# Patient Record
Sex: MALE | Race: WHITE | Employment: FULL TIME | URBAN - METROPOLITAN AREA
[De-identification: names, ages, dates, MRNs, and addresses within clinical notes are randomized per-mention and may not be internally consistent; named-entity substitution may affect disease eponyms.]

---

## 2017-01-19 ENCOUNTER — HOSPITAL ENCOUNTER (OUTPATIENT)
Dept: ULTRASOUND IMAGING | Age: 27
Discharge: HOME | End: 2017-01-19
Payer: COMMERCIAL

## 2017-01-19 DIAGNOSIS — N50.811 TESTICULAR PAIN, RIGHT: ICD-10-CM

## 2017-01-19 PROCEDURE — 76870 US EXAM SCROTUM: CPT

## 2021-03-19 ENCOUNTER — TRANSCRIBE ORDER (OUTPATIENT)
Dept: SCHEDULING | Age: 31
End: 2021-03-19

## 2021-03-19 DIAGNOSIS — R74.01 TRANSAMINITIS: Primary | ICD-10-CM

## 2021-04-02 ENCOUNTER — HOSPITAL ENCOUNTER (OUTPATIENT)
Dept: ULTRASOUND IMAGING | Age: 31
Discharge: HOME | End: 2021-04-02
Attending: INTERNAL MEDICINE
Payer: COMMERCIAL

## 2021-04-02 DIAGNOSIS — R74.01 TRANSAMINITIS: ICD-10-CM

## 2021-04-02 PROCEDURE — 76705 ECHO EXAM OF ABDOMEN: CPT

## 2021-11-18 ENCOUNTER — OFFICE VISIT (OUTPATIENT)
Dept: URGENT CARE | Age: 31
End: 2021-11-18
Payer: COMMERCIAL

## 2021-11-18 VITALS — TEMPERATURE: 98.6 F | HEART RATE: 72 BPM | RESPIRATION RATE: 16 BRPM | OXYGEN SATURATION: 95 %

## 2021-11-18 DIAGNOSIS — Z20.822 EXPOSURE TO COVID-19 VIRUS: Primary | ICD-10-CM

## 2021-11-18 LAB — SARS-COV-2 POC: NEGATIVE

## 2021-11-18 PROCEDURE — 87426 SARSCOV CORONAVIRUS AG IA: CPT | Performed by: FAMILY MEDICINE

## 2021-11-18 PROCEDURE — 99202 OFFICE O/P NEW SF 15 MIN: CPT | Performed by: FAMILY MEDICINE

## 2021-11-18 RX ORDER — DEXTROAMPHETAMINE SACCHARATE, AMPHETAMINE ASPARTATE, DEXTROAMPHETAMINE SULFATE, AND AMPHETAMINE SULFATE 5; 5; 5; 5 MG/1; MG/1; MG/1; MG/1
20 TABLET ORAL
COMMUNITY

## 2021-11-18 NOTE — LETTER
November 18, 2021  [address] [facility]    Dear Jules Red: Thank you for requesting access to [facility]. Please follow the instructions below to securely access and download your online medical record. [facility] allows you to send messages to your doctor, view your test results, renew your prescriptions, schedule appointments, and more. How Do I Sign Up? 1. In your internet browser, go to [email or web address]. 2. Click on the First Time User? Click Here link in the Sign In box. You will see the New Member Sign Up page. 3. Enter your [facility] Access Code exactly as it appears below. You will not need to use this code after youve completed the sign-up process. If you do not sign up before the expiration date, you must request a new code. [facility] Access Code: [email or web address]  Expires: 1/2/2022  1:56 PM     4. Enter the last four digits of your Social Security Number (xxxx) and Date of Birth (mm/dd/yyyy) as indicated and click Submit. You will be taken to the next sign-up page. 5. Create a [facility] ID. This will be your [facility] login ID and cannot be changed, so think of one that is secure and easy to remember. 6. Create a [facility] password. You can change your password at any time. 7. Enter your Password Reset Question and Answer. This can be used at a later time if you forget your password. 8. Enter your e-mail address. You will receive e-mail notification when new information is available in [address]. 9. Click Sign Up. You can now view and download portions of your medical record. 10. Click the Download Summary menu link to download a portable copy of your medical information. Additional Information    If you have questions, please visit the Frequently Asked Questions section of the [facility] website at [email or web address] Remember, [facility] is NOT to be used for urgent needs. For medical emergencies, dial 911.     Now available from your iPhone and Android!     Sincerely,   [employer]

## 2021-11-18 NOTE — PROGRESS NOTES
This patient was seen at [address] [place] [address] [facility]gent [facility] while in their vehicle due to COVID-19 pandemic with PPE and focused examination in order to decrease community viral transmission. The patient/guardian gave verbal consent to treat. Piedad Cedillo is a 32 y.o. male who presents for COVID-19 testing. Was exposed to COVID-19 his infant who tested positive yesterday. Denies cough, fever, SOB, N/V/D. COVID vaccinated. The history is provided by the patient. Past Medical History:   Diagnosis Date    Ill-defined condition     anxiety         History reviewed. No pertinent surgical history. History reviewed. No pertinent family history. Social History     Socioeconomic History    Marital status: OTHER     Spouse name: Not on file    Number of children: Not on file    Years of education: Not on file    Highest education level: Not on file   Occupational History    Not on file   Tobacco Use    Smoking status: Never Smoker    Smokeless tobacco: Never Used   Substance and Sexual Activity    Alcohol use: Not on file    Drug use: Not on file    Sexual activity: Not on file   Other Topics Concern    Not on file   Social History Narrative    Not on file     Social Determinants of Health     Financial Resource Strain:     Difficulty of Paying Living Expenses: Not on file   Food Insecurity:     Worried About Running Out of Food in the Last Year: Not on file    [place] of Food in the Last Year: Not on file   Transportation Needs:     Lack of Transportation (Medical): Not on file    Lack of Transportation (Non-Medical):  Not on file   Physical Activity:     Days of Exercise per Week: Not on file    Minutes of Exercise per Session: Not on file   Stress:     Feeling of Stress : Not on file   Social Connections:     Frequency of Communication with Friends and Family: Not on file    Frequency of Social Gatherings with Friends and Family: Not on file    Attends Religion Services: Not on file    Active Member of Clubs or Organizations: Not on file    Attends Club or Organization Meetings: Not on file    Marital Status: Not on file   Intimate Partner Violence:     Fear of Current or Ex-Partner: Not on file    Emotionally Abused: Not on file    Physically Abused: Not on file    Sexually Abused: Not on file   Housing Stability:     Unable to Pay for Housing in the Last Year: Not on file    Number of [place] in the Last Year: Not on file    Unstable Housing in the Last Year: Not on file                ALLERGIES: Penicillins and Cefaclor    Review of Systems   Constitutional: Negative for fever. Respiratory: Negative for cough and shortness of breath. Gastrointestinal: Negative for diarrhea, nausea and vomiting. Vitals:    11/18/21 1417   Pulse: 72   Resp: 16   Temp: 98.6 °F (37 °C)   SpO2: 95%       Physical Exam  Vitals and nursing note reviewed. Constitutional:       General: He is not in acute distress. Appearance: He is well-developed. He is not diaphoretic. Pulmonary:      Effort: Pulmonary effort is normal.   Neurological:      Mental Status: He is alert. Psychiatric:         Behavior: Behavior normal.         Thought Content: Thought content normal.         Judgment: Judgment normal.         MDM    ICD-10-CM ICD-9-CM   1. Exposure to COVID-19 virus  Z20.822 V01.79       Orders Placed This Encounter    AMB POC SARS-COV-2 ANTIGEN     Order Specific Question:   Is this test for diagnosis or screening? Answer:   Screening     Order Specific Question:   Symptomatic for COVID-19 as defined by CDC? Answer:   No     Order Specific Question:   Hospitalized for COVID-19? Answer:   No     Order Specific Question:   Admitted to ICU for COVID-19? Answer:   No     Order Specific Question:   Employed in healthcare setting? Answer:   Unknown     Order Specific Question:   Resident in a congregate (group) care setting?      Answer:   No     Order Specific Question:   Previously tested for COVID-19?      Answer:   Unknown      Await PCR COVID results  Mask in public x 14 days from last exposure date  Quarantine x 10 days from sx onset if start having symptoms    Results for orders placed or performed in visit on 11/18/21   AMB POC SARS-COV-2   Result Value Ref Range    SARS-COV-2 POC Negative Negative           Procedures

## 2021-11-23 ENCOUNTER — OFFICE VISIT (OUTPATIENT)
Dept: URGENT CARE | Age: 31
End: 2021-11-23
Payer: COMMERCIAL

## 2021-11-23 VITALS — TEMPERATURE: 98.9 F | RESPIRATION RATE: 16 BRPM | HEART RATE: 107 BPM | OXYGEN SATURATION: 97 %

## 2021-11-23 DIAGNOSIS — Z20.822 EXPOSURE TO COVID-19 VIRUS: ICD-10-CM

## 2021-11-23 DIAGNOSIS — U07.1 COVID-19: Primary | ICD-10-CM

## 2021-11-23 DIAGNOSIS — R09.81 SINUS CONGESTION: ICD-10-CM

## 2021-11-23 LAB — SARS-COV-2 POC: POSITIVE

## 2021-11-23 PROCEDURE — 99212 OFFICE O/P EST SF 10 MIN: CPT | Performed by: FAMILY MEDICINE

## 2021-11-23 PROCEDURE — 87426 SARSCOV CORONAVIRUS AG IA: CPT | Performed by: FAMILY MEDICINE

## 2021-11-23 NOTE — PROGRESS NOTES
This patient was seen at [address] [place] [address] [facility]gent Care while in their vehicle due to COVID-19 pandemic with PPE and focused examination in order to decrease community viral transmission. The patient/guardian gave verbal consent to treat. Godfrey Robles is a 32 y.o. male who presents for COVID-19 testing. Was exposed to COVID-19 by infant who tested positive 7 days ago. Denies cough, fever, SOB, N/V/D. Admits to slight sinus congestion x 3 days. The history is provided by the patient. Past Medical History:   Diagnosis Date    Ill-defined condition     anxiety         History reviewed. No pertinent surgical history. History reviewed. No pertinent family history. Social History     Socioeconomic History    Marital status: OTHER     Spouse name: Not on file    Number of children: Not on file    Years of education: Not on file    Highest education level: Not on file   Occupational History    Not on file   Tobacco Use    Smoking status: Never Smoker    Smokeless tobacco: Never Used   Substance and Sexual Activity    Alcohol use: Not on file    Drug use: Not on file    Sexual activity: Not on file   Other Topics Concern    Not on file   Social History Narrative    Not on file     Social Determinants of Health     Financial Resource Strain:     Difficulty of Paying Living Expenses: Not on file   Food Insecurity:     Worried About Running Out of Food in the Last Year: Not on file    [place] of Food in the Last Year: Not on file   Transportation Needs:     Lack of Transportation (Medical): Not on file    Lack of Transportation (Non-Medical):  Not on file   Physical Activity:     Days of Exercise per Week: Not on file    Minutes of Exercise per Session: Not on file   Stress:     Feeling of Stress : Not on file   Social Connections:     Frequency of Communication with Friends and Family: Not on file    Frequency of Social Gatherings with Friends and Family: Not on file   Coy Pappas Attends Spiritism Services: Not on file    Active Member of Clubs or Organizations: Not on file    Attends Club or Organization Meetings: Not on file    Marital Status: Not on file   Intimate Partner Violence:     Fear of Current or Ex-Partner: Not on file    Emotionally Abused: Not on file    Physically Abused: Not on file    Sexually Abused: Not on file   Housing Stability:     Unable to Pay for Housing in the Last Year: Not on file    Number of [place] in the Last Year: Not on file    Unstable Housing in the Last Year: Not on file                ALLERGIES: Penicillins and Cefaclor    Review of Systems   Constitutional: Negative for fever. HENT: Positive for congestion. Respiratory: Negative for cough and shortness of breath. Gastrointestinal: Negative for diarrhea, nausea and vomiting. Vitals:    11/23/21 1826   Pulse: (!) 107   Resp: 16   Temp: 98.9 °F (37.2 °C)   SpO2: 97%       Physical Exam  Vitals and nursing note reviewed. Constitutional:       General: He is not in acute distress. Appearance: He is well-developed. He is not diaphoretic. Pulmonary:      Effort: Pulmonary effort is normal.   Neurological:      Mental Status: He is alert. Psychiatric:         Behavior: Behavior normal.         Thought Content: Thought content normal.         Judgment: Judgment normal.         MDM    ICD-10-CM ICD-9-CM   1. COVID-19  U07.1 079.89   2. Exposure to COVID-19 virus  Z20.822 V01.79   3. Sinus congestion  R09.81 478.19       Orders Placed This Encounter    AMB POC SARS-COV-2 ANTIGEN     Order Specific Question:   Is this test for diagnosis or screening? Answer:   Diagnosis of ill patient     Order Specific Question:   Symptomatic for COVID-19 as defined by CDC? Answer:   Yes     Order Specific Question:   Date of Symptom Onset     Answer:   11/21/2021     Order Specific Question:   Hospitalized for COVID-19?      Answer:   No     Order Specific Question:   Admitted to ICU for COVID-19? Answer:   No     Order Specific Question:   Employed in healthcare setting? Answer:   Unknown     Order Specific Question:   Resident in a congregate (group) care setting? Answer:   No     Order Specific Question:   Previously tested for COVID-19? Answer:   Yes        Quarantine x 10 days from sx onset  Deep breathing exercises, ambulation  Tylenol prn  Increase fluids with electrolytes    If signs and symptoms become worse the pt is to go to the ER.      Results for orders placed or performed in visit on 11/23/21   AMB POC SARS-COV-2   Result Value Ref Range    SARS-COV-2 POC Positive (A) Negative       Procedures

## 2021-12-14 ENCOUNTER — TRANSCRIBE ORDER (OUTPATIENT)
Dept: SCHEDULING | Age: 31
End: 2021-12-14

## 2021-12-14 DIAGNOSIS — N63.0 PALPABLE MASS OF BREAST: Primary | ICD-10-CM

## 2021-12-23 ENCOUNTER — HOSPITAL ENCOUNTER (OUTPATIENT)
Dept: MAMMOGRAPHY | Age: 31
Discharge: HOME | End: 2021-12-23
Attending: NURSE PRACTITIONER
Payer: COMMERCIAL

## 2021-12-23 DIAGNOSIS — N63.0 PALPABLE MASS OF BREAST: ICD-10-CM

## 2021-12-23 PROCEDURE — 77066 DX MAMMO INCL CAD BI: CPT

## 2021-12-23 PROCEDURE — 76642 ULTRASOUND BREAST LIMITED: CPT

## 2021-12-23 PROCEDURE — 76641 ULTRASOUND BREAST COMPLETE: CPT
